# Patient Record
Sex: FEMALE
[De-identification: names, ages, dates, MRNs, and addresses within clinical notes are randomized per-mention and may not be internally consistent; named-entity substitution may affect disease eponyms.]

---

## 2018-02-09 ENCOUNTER — HOSPITAL ENCOUNTER (OUTPATIENT)
Dept: HOSPITAL 9 - MADLABBHPM | Age: 53
Discharge: HOME | End: 2018-02-09
Attending: FAMILY MEDICINE
Payer: COMMERCIAL

## 2018-02-09 DIAGNOSIS — E03.9: Primary | ICD-10-CM

## 2018-02-09 PROCEDURE — 36415 COLL VENOUS BLD VENIPUNCTURE: CPT

## 2018-02-09 PROCEDURE — 84443 ASSAY THYROID STIM HORMONE: CPT

## 2018-03-23 ENCOUNTER — HOSPITAL ENCOUNTER (OUTPATIENT)
Dept: HOSPITAL 92 - ULT | Age: 53
Discharge: HOME | End: 2018-03-23
Payer: COMMERCIAL

## 2018-03-23 DIAGNOSIS — N20.0: Primary | ICD-10-CM

## 2018-03-23 DIAGNOSIS — N28.89: ICD-10-CM

## 2018-03-23 PROCEDURE — 74018 RADEX ABDOMEN 1 VIEW: CPT

## 2018-03-23 PROCEDURE — 76770 US EXAM ABDO BACK WALL COMP: CPT

## 2018-03-23 NOTE — RAD
RADIOGRAPH ABDOMEN ONE VIEW:

 

03/23/2018

 

HISTORY:

A 52-year-old female with renal calculus.

 

COMPARISON:

None.

 

FINDINGS:

KUB demonstrates a cluster of a few small calculi overlying the left upper quadrant.  The largest is 
approximately 5 mm.  The bowel gas pattern is normal.

 

IMPRESSION:

Left calcification cluster, which may or may not represent left-sided nephrolithiasis.  Consider nonc
ontrast CT.

 

POS: [facility]

## 2018-03-23 NOTE — ULT
ULTRASOUND RETROPERITONEUM COMPLETE:

(RENAL)

 

HISTORY: 

52-year-old female with calculus of kidney. 

 

FINDINGS: 

 

Right kidney is 8.5 x 4 x 4 cm. 

Left kidney is 9.5 x 4.5 x 4.5 cm. 

No hydronephrosis bilaterally. 

 

The renal parenchyma appears diffusely thin bilaterally. This could represent actual thinning of the 
parenchyma, with peripheral encroachment by the fat of the renal sinuses.  Alternatively, this appear
ance could be due to diffusely increased echogenicity of the medullary pyramids bilaterally, sonograp
hically indistinguishable and inseparable from the normally high echogenicity of the fat of the renal
 sinuses. No moderate sized or large solid or cystic renal mass is identified. Unremarkable urinary b
ladder with volume of 125 mL. 

 

There is a region of shadowing from the corticomedullary junction of the left renal lower pole. It is
 uncertain whether or not this is due to any calculus that may be present here. 

 

IMPRESSION: 

1.  Abnormal appearance of the kidneys: There is either bilateral thinning of renal parenchyma or dif
fusely increased renal medullary echogenicity. The latter is slightly favored, in which case this cou
ld represent nephrocalcinosis (the common causes of which include medullary sponge kidney, renal tubu
lar acidosis, and milk-alkali syndrome). 

 

2.  Region of shadowing at the left renal lower pole, possibly representing a calculus or cluster of 
calculi. 

 

VALENTE MARROQUIN

 

POS: [facility]

## 2019-11-15 ENCOUNTER — HOSPITAL ENCOUNTER (OUTPATIENT)
Dept: HOSPITAL 92 - BICCT | Age: 54
Discharge: HOME | End: 2019-11-15
Attending: UROLOGY
Payer: COMMERCIAL

## 2019-11-15 DIAGNOSIS — N20.0: Primary | ICD-10-CM

## 2019-11-15 DIAGNOSIS — K80.20: ICD-10-CM

## 2019-11-15 DIAGNOSIS — R35.0: ICD-10-CM

## 2019-11-15 PROCEDURE — 74176 CT ABD & PELVIS W/O CONTRAST: CPT

## 2019-11-15 NOTE — CT
CT ABDOMEN AND PELVIS WITHOUT CONTRAST:

 

Date:  11/15/19 

 

HISTORY:  

Kidney stones with frequent micturition. 

 

TECHNIQUE:  

Multiple contiguous axial images were obtained in a CT of the abdomen and pelvis without contrast. Sa
gittal and coronal reformats were performed. 

 

FINDINGS:

There are extensive calcifications within the medullary portions of both kidneys. There is cortical t
hinning bilaterally. There are nonobstructing bilateral urinary collecting system calcifications adilia
uring up to 5 mm in size. No calcifications are seen in either ureter or within the urinary bladder. 


 

There are calcified gallstones in the gallbladder. The liver, adrenal glands, spleen, and pancreas ar
e unremarkable, although evaluation is limited without IV contrast. 

 

The large and small bowel are unremarkable. The appendix is normal. The reproductive organs are unrem
arkable. No abdominal or pelvic lymphadenopathy are seen. 

 

Degenerative changes are seen in the spine. The visualized inferior thorax and abdominal wall soft ti
ssues are unremarkable. 

 

IMPRESSION: 

1.  Bilateral nonobstructing kidney stones. 

2.  Renal medullary calcinosis. 

3.  Cholelithiasis. 

 

 

POS: [facility]